# Patient Record
Sex: MALE | Race: WHITE | URBAN - METROPOLITAN AREA
[De-identification: names, ages, dates, MRNs, and addresses within clinical notes are randomized per-mention and may not be internally consistent; named-entity substitution may affect disease eponyms.]

---

## 2017-11-30 ENCOUNTER — OFFICE VISIT (OUTPATIENT)
Dept: FAMILY MEDICINE CLINIC | Age: 47
End: 2017-11-30

## 2017-11-30 VITALS
HEIGHT: 69 IN | OXYGEN SATURATION: 99 % | WEIGHT: 173 LBS | HEART RATE: 81 BPM | TEMPERATURE: 97.7 F | SYSTOLIC BLOOD PRESSURE: 120 MMHG | BODY MASS INDEX: 25.62 KG/M2 | RESPIRATION RATE: 15 BRPM | DIASTOLIC BLOOD PRESSURE: 70 MMHG

## 2017-11-30 DIAGNOSIS — M25.532 PAIN IN BOTH WRISTS: Primary | ICD-10-CM

## 2017-11-30 DIAGNOSIS — M25.531 PAIN IN BOTH WRISTS: Primary | ICD-10-CM

## 2017-11-30 RX ORDER — DICLOFENAC SODIUM 50 MG/1
50 TABLET, DELAYED RELEASE ORAL 2 TIMES DAILY
Qty: 40 TAB | Refills: 1 | Status: SHIPPED | OUTPATIENT
Start: 2017-11-30

## 2017-11-30 NOTE — PATIENT INSTRUCTIONS
Musculoskeletal Pain: Care Instructions  Your Care Instructions    Different problems with the bones, muscles, nerves, ligaments, and tendons in the body can cause pain. One or more areas of your body may ache or burn. Or they may feel tired, stiff, or sore. The medical term for this type of pain is musculoskeletal pain. It can have many different causes. Sometimes the pain is caused by an injury such as a strain or sprain. Or you might have pain from using one part of your body in the same way over and over again. This is called overuse. In some cases, the cause of the pain is another health problem such as arthritis or fibromyalgia. The doctor will examine you and ask you questions about your health to help find the cause of your pain. Blood tests or imaging tests like an X-ray may also be helpful. But sometimes doctors can't find a cause of the pain. Treatment depends on your symptoms and the cause of the pain, if known. The doctor has checked you carefully, but problems can develop later. If you notice any problems or new symptoms, get medical treatment right away. Follow-up care is a key part of your treatment and safety. Be sure to make and go to all appointments, and call your doctor if you are having problems. It's also a good idea to know your test results and keep a list of the medicines you take. How can you care for yourself at home? · Rest until you feel better. · Do not do anything that makes the pain worse. Return to exercise gradually if you feel better and your doctor says it's okay. · Be safe with medicines. Read and follow all instructions on the label. ¨ If the doctor gave you a prescription medicine for pain, take it as prescribed. ¨ If you are not taking a prescription pain medicine, ask your doctor if you can take an over-the-counter medicine. · Put ice or a cold pack on the area for 10 to 20 minutes at a time to ease pain.  Put a thin cloth between the ice and your skin.  When should you call for help? Call your doctor now or seek immediate medical care if:  ? · You have new pain, or your pain gets worse. ? · You have new symptoms such as a fever, a rash, or chills. ? Watch closely for changes in your health, and be sure to contact your doctor if:  ? · You do not get better as expected. Where can you learn more? Go to http://danielle-latoya.info/. Enter O322 in the search box to learn more about \"Musculoskeletal Pain: Care Instructions. \"  Current as of: October 14, 2016  Content Version: 11.4  © 5001-5867 [employer]. Care instructions adapted under license by [employer] (which disclaims liability or warranty for this information). If you have questions about a medical condition or this instruction, always ask your healthcare professional. [address] any warranty or liability for your use of this information. Carpal Tunnel Syndrome: Care Instructions  Your Care Instructions    Carpal tunnel syndrome is a nerve problem. It can cause tingling, numbness, weakness, or pain in the fingers, thumb, and hand. The median nerve and several tough tissues called tendons run through a space in the wrist called the carpal tunnel. The repeated hand motions used in work and some hobbies and sports can put pressure on the nerve. Pregnancy and several conditions, including diabetes, arthritis, and an underactive thyroid, also can cause carpal tunnel syndrome. You may be able to limit an activity or do it differently to reduce your symptoms. You also can take other steps to feel better. If your symptoms are mild, 1 to 2 weeks of home treatment are likely to ease your pain. Surgery is needed only if other treatments do not work. Follow-up care is a key part of your treatment and safety. Be sure to make and go to all appointments, and call your doctor if you are having problems.  It's also a good idea to know your test results and keep a list of the medicines you take. How can you care for yourself at home? · If possible, stop or reduce the activity that causes your symptoms. If you cannot stop the activity, take frequent breaks to rest and stretch or change hand positions to do a task. Try switching hands, such as when using a computer mouse. · Try to avoid bending or twisting your wrists. · Ask your doctor if you can take an over-the-counter pain medicine, such as acetaminophen (Tylenol), ibuprofen (Advil, Motrin), or naproxen (Aleve). Be safe with medicines. Read and follow all instructions on the label. · If your doctor prescribes corticosteroid medicine to help reduce pain and swelling, take it exactly as prescribed. Call your doctor if you think you are having a problem with your medicine. · Put ice or a cold pack on your wrist for 10 to 20 minutes at a time to ease pain. Put a thin cloth between the ice and your skin. · If your doctor or your physical or occupational therapist tells you to wear a wrist splint, wear it as directed to keep your wrist in a neutral position. This also eases pressure on your median nerve. · Ask your doctor whether you should have physical or occupational therapy to learn how to do tasks differently. · Try a yoga class to stretch your muscles and build strength in your hands and wrists. Yoga has been shown to ease carpal tunnel symptoms. To prevent carpal tunnel  · When working at a computer, keep your hands and wrists in line with your forearms. Hold your elbows close to your sides. Take a break every 10 to 15 minutes. · Try these exercises:  ¨ Warm up: Rotate your wrist up, down, and from side to side. Repeat this 4 times. Stretch your fingers far apart, relax them, then stretch them again. Repeat 4 times. Stretch your thumb by pulling it back gently, holding it, and then releasing it. Repeat 4 times.   ¨ Prayer stretch: Start with your palms together in front of your chest just below your chin. Slowly lower your hands toward your waistline while keeping your hands close to your stomach and your palms together until you feel a mild to moderate stretch under your forearms. Hold for 10 to 20 seconds. Repeat 4 times. ¨ Wrist flexor stretch: Hold your arm in front of you with your palm up. Bend your wrist, pointing your hand toward the floor. With your other hand, gently bend your wrist further until you feel a mild to moderate stretch in your forearm. Hold for 10 to 20 seconds. Repeat 4 times. ¨ Wrist extensor stretch: Repeat the steps for the wrist flexor stretch, but begin with your extended hand palm down. · Squeeze a rubber exercise ball several times a day to keep your hands and fingers strong. · Avoid holding objects (such as a book) in one position for a long time. When possible, use your whole hand to grasp an object. Using just the thumb and index finger can put stress on the wrist.  · Do not smoke. It can make this condition worse by reducing blood flow to the median nerve. If you need help quitting, talk to your doctor about stop-smoking programs and medicines. These can increase your chances of quitting for good. When should you call for help? Watch closely for changes in your health, and be sure to contact your doctor if:  ? · Your pain or other problems do not get better with home care. ? · You want more information about physical or occupational therapy. ? · You have side effects of your corticosteroid medicine, such as:  ¨ Weight gain. ¨ Mood changes. ¨ Trouble sleeping. ¨ Bruising easily. ? · You have any other problems with your medicine. Where can you learn more? Go to http://danielle-latoya.info/. Enter R432 in the search box to learn more about \"Carpal Tunnel Syndrome: Care Instructions. \"  Current as of: March 21, 2017  Content Version: 11.4  © 4677-8376 [employer].  Care instructions adapted under license by [employer] (which disclaims liability or warranty for this information). If you have questions about a medical condition or this instruction, always ask your healthcare professional. [address] any warranty or liability for your use of this information.

## 2017-11-30 NOTE — MR AVS SNAPSHOT
Visit Information Date & Time Provider Department Dept. Phone Encounter #  
 11/30/2017 10:45 AM Governor Damien MD [phone] [id number] Follow-up Instructions Return if symptoms worsen or fail to improve. Upcoming Health Maintenance Date Due Pneumococcal 19-64 Medium Risk (1 of 1 - PPSV23) 5/17/1991 DTaP/Tdap/Td series (1 - Tdap) 5/17/1993 Allergies as of 11/30/2017  Review Complete On: 11/30/2017 By: Governor Damien MD  
 No Known Allergies Current Immunizations  Never Reviewed No immunizations on file. Not reviewed this visit You Were Diagnosed With   
  
 Codes Comments Pain in both wrists    -  Primary ICD-10-CM: M25.531, M25.532 ICD-9-CM: 719.43 Vitals BP Pulse Temp Resp Height(growth percentile) Weight(growth percentile) 120/70 81 97.7 °F (36.5 °C) 15 5' 9\" (1.753 m) 173 lb (78.5 kg) SpO2 BMI Smoking Status 99% 25.55 kg/m2 Former Smoker Vitals History BMI and BSA Data Body Mass Index Body Surface Area 25.55 kg/m 2 1.95 m 2 Your Updated Medication List  
  
   
This list is accurate as of: 11/30/17 11:18 AM.  Always use your most recent med list.  
  
  
  
  
 diclofenac EC 50 mg EC tablet Commonly known as:  VOLTAREN Take 1 Tab by mouth two (2) times a day. Prescriptions Printed Refills  
 diclofenac EC (VOLTAREN) 50 mg EC tablet 1 Sig: Take 1 Tab by mouth two (2) times a day. Class: Print Route: Oral  
  
Follow-up Instructions Return if symptoms worsen or fail to improve. To-Do List   
 11/30/2017 Lab:  CBC WITH AUTOMATED DIFF   
  
 11/30/2017 Neurology:  EMG TWO EXTREMITIES UPPER   
  
 11/30/2017 Lab:  LIPID PANEL   
  
 11/30/2017 Lab:  METABOLIC PANEL, COMPREHENSIVE   
  
 11/30/2017 Lab:  SED RATE (ESR)   
  
 11/30/2017 Lab:  VITAMIN D, 25 HYDROXY Referral Information Referral ID Referred By Referred To  
  
 [id number] Erica Martinezch[facility]RO Not Available Visits Status Start Date End Date 1 New Request 11/30/17 11/30/18 If your referral has a status of pending review or denied, additional information will be sent to support the outcome of this decision. Patient Instructions Musculoskeletal Pain: Care Instructions Your Care Instructions Different problems with the bones, muscles, nerves, ligaments, and tendons in the body can cause pain. One or more areas of your body may ache or burn. Or they may feel tired, stiff, or sore. The medical term for this type of pain is musculoskeletal pain. It can have many different causes. Sometimes the pain is caused by an injury such as a strain or sprain. Or you might have pain from using one part of your body in the same way over and over again. This is called overuse. In some cases, the cause of the pain is another health problem such as arthritis or fibromyalgia. The doctor will examine you and ask you questions about your health to help find the cause of your pain. Blood tests or imaging tests like an X-ray may also be helpful. But sometimes doctors can't find a cause of the pain. Treatment depends on your symptoms and the cause of the pain, if known. The doctor has checked you carefully, but problems can develop later. If you notice any problems or new symptoms, get medical treatment right away. Follow-up care is a key part of your treatment and safety. Be sure to make and go to all appointments, and call your doctor if you are having problems. It's also a good idea to know your test results and keep a list of the medicines you take. How can you care for yourself at home? · Rest until you feel better. · Do not do anything that makes the pain worse. Return to exercise gradually if you feel better and your doctor says it's okay. · Be safe with medicines. Read and follow all instructions on the label. ¨ If the doctor gave you a prescription medicine for pain, take it as prescribed. ¨ If you are not taking a prescription pain medicine, ask your doctor if you can take an over-the-counter medicine. · Put ice or a cold pack on the area for 10 to 20 minutes at a time to ease pain. Put a thin cloth between the ice and your skin. When should you call for help? Call your doctor now or seek immediate medical care if: 
? · You have new pain, or your pain gets worse. ? · You have new symptoms such as a fever, a rash, or chills. ? Watch closely for changes in your health, and be sure to contact your doctor if: 
? · You do not get better as expected. Where can you learn more? Go to http://danielle-latoya.info/. Enter U444 in the search box to learn more about \"Musculoskeletal Pain: Care Instructions. \" Current as of: October 14, 2016 Content Version: 11.4 © 0690-1230 [employer]. Care instructions adapted under license by [employer] (which disclaims liability or warranty for this information). If you have questions about a medical condition or this instruction, always ask your healthcare professional. [place] [address] any warranty or liability for your use of this information. Carpal Tunnel Syndrome: Care Instructions Your Care Instructions Carpal tunnel syndrome is a nerve problem. It can cause tingling, numbness, weakness, or pain in the fingers, thumb, and hand. The median nerve and several tough tissues called tendons run through a space in the wrist called the carpal tunnel. The repeated hand motions used in work and some hobbies and sports can put pressure on the nerve. Pregnancy and several conditions, including diabetes, arthritis, and an underactive thyroid, also can cause carpal tunnel syndrome.  
You may be able to limit an activity or do it differently to reduce your symptoms. You also can take other steps to feel better. If your symptoms are mild, 1 to 2 weeks of home treatment are likely to ease your pain. Surgery is needed only if other treatments do not work. Follow-up care is a key part of your treatment and safety. Be sure to make and go to all appointments, and call your doctor if you are having problems. It's also a good idea to know your test results and keep a list of the medicines you take. How can you care for yourself at home? · If possible, stop or reduce the activity that causes your symptoms. If you cannot stop the activity, take frequent breaks to rest and stretch or change hand positions to do a task. Try switching hands, such as when using a computer mouse. · Try to avoid bending or twisting your wrists. · Ask your doctor if you can take an over-the-counter pain medicine, such as acetaminophen (Tylenol), ibuprofen (Advil, Motrin), or naproxen (Aleve). Be safe with medicines. Read and follow all instructions on the label. · If your doctor prescribes corticosteroid medicine to help reduce pain and swelling, take it exactly as prescribed. Call your doctor if you think you are having a problem with your medicine. · Put ice or a cold pack on your wrist for 10 to 20 minutes at a time to ease pain. Put a thin cloth between the ice and your skin. · If your doctor or your physical or occupational therapist tells you to wear a wrist splint, wear it as directed to keep your wrist in a neutral position. This also eases pressure on your median nerve. · Ask your doctor whether you should have physical or occupational therapy to learn how to do tasks differently. · Try a yoga class to stretch your muscles and build strength in your hands and wrists. Yoga has been shown to ease carpal tunnel symptoms. To prevent carpal tunnel · When working at a computer, keep your hands and wrists in line with your forearms. Hold your elbows close to your sides. Take a break every 10 to 15 minutes. · Try these exercises: ¨ Warm up: Rotate your wrist up, down, and from side to side. Repeat this 4 times. Stretch your fingers far apart, relax them, then stretch them again. Repeat 4 times. Stretch your thumb by pulling it back gently, holding it, and then releasing it. Repeat 4 times. ¨ Prayer stretch: Start with your palms together in front of your chest just below your chin. Slowly lower your hands toward your waistline while keeping your hands close to your stomach and your palms together until you feel a mild to moderate stretch under your forearms. Hold for 10 to 20 seconds. Repeat 4 times. ¨ Wrist flexor stretch: Hold your arm in front of you with your palm up. Bend your wrist, pointing your hand toward the floor. With your other hand, gently bend your wrist further until you feel a mild to moderate stretch in your forearm. Hold for 10 to 20 seconds. Repeat 4 times. ¨ Wrist extensor stretch: Repeat the steps for the wrist flexor stretch, but begin with your extended hand palm down. · Squeeze a rubber exercise ball several times a day to keep your hands and fingers strong. · Avoid holding objects (such as a book) in one position for a long time. When possible, use your whole hand to grasp an object. Using just the thumb and index finger can put stress on the wrist. 
· Do not smoke. It can make this condition worse by reducing blood flow to the median nerve. If you need help quitting, talk to your doctor about stop-smoking programs and medicines. These can increase your chances of quitting for good. When should you call for help? Watch closely for changes in your health, and be sure to contact your doctor if: 
? · Your pain or other problems do not get better with home care. ? · You want more information about physical or occupational therapy. ? · You have side effects of your corticosteroid medicine, such as: ¨ Weight gain. ¨ Mood changes. ¨ Trouble sleeping. ¨ Bruising easily. ? · You have any other problems with your medicine. Where can you learn more? Go to http://danielle-latoya.info/. Enter R432 in the search box to learn more about \"Carpal Tunnel Syndrome: Care Instructions. \" Current as of: March 21, 2017 Content Version: 11.4 © 6800-0396 [employer]ostle. Care instructions adapted under license by [employer] (which disclaims liability or warranty for this information). If you have questions about a medical condition or this instruction, always ask your healthcare professional. [place] [address] any warranty or liability for your use of this information. Introducing [facility]! [place] introduces Fresvii patient portal. Now you can access parts of your medical record, email your doctor's office, and request medication refills online. 1. In your internet browser, go to [email or web address] 2. Click on the First Time User? Click Here link in the Sign In box. You will see the New Member Sign Up page. 3. Enter your Fresvii Access Code exactly as it appears below. You will not need to use this code after youve completed the sign-up process. If you do not sign up before the expiration date, you must request a new code. · Fresvii Access Code: [email or web address] Expires: 2/28/2018 10:56 AM 
 
4. Enter the last four digits of your Social Security Number (xxxx) and Date of Birth (mm/dd/yyyy) as indicated and click Submit. You will be taken to the next sign-up page. 5. Create a Fresvii ID. This will be your Fresvii login ID and cannot be changed, so think of one that is secure and easy to remember. 6. Create a Fresvii password. You can change your password at any time. 7. Enter your Password Reset Question and Answer. This can be used at a later time if you forget your password. 8. Enter your e-mail address. You will receive e-mail notification when new information is available in [address]. 9. Click Sign Up. You can now view and download portions of your medical record. 10. Click the Download Summary menu link to download a portable copy of your medical information. If you have questions, please visit the Frequently Asked Questions section of the [employer] website. Remember, [employer] is NOT to be used for urgent needs. For medical emergencies, dial 911. Now available from your iPhone and Android! Please provide this summary of care documentation to your next provider. Your primary care clinician is listed as [id number] [facility]. If you have any questions after today's visit, please call [phone].

## 2017-11-30 NOTE — PROGRESS NOTES
Courtney Girard. is a 41 y.o. male  presents for establish care. He has history of bilateral wrist pain. No Known Allergies    Patient Active Problem List   Diagnosis Code    Asthma J45.909    Hyperlipidemia E78.5     No past medical history on file. Social History     Social History    Marital status:      Spouse name: N/A    Number of children: N/A    Years of education: N/A     Social History Main Topics    Smoking status: Former Smoker     Quit date: 6/1/2001    Smokeless tobacco: Never Used    Alcohol use Yes      Comment: occ    Drug use: No    Sexual activity: Yes     Partners: Female     Other Topics Concern    None     Social History Narrative     Family History   Problem Relation Age of Onset    Heart Disease Father         Review of Systems   Constitutional: Negative for chills and fever. Cardiovascular: Negative for chest pain and palpitations. Gastrointestinal: Negative for nausea and vomiting. Vitals:    11/30/17 1059   BP: 120/70   Pulse: 81   Resp: 15   Temp: 97.7 °F (36.5 °C)   SpO2: 99%   Weight: 173 lb (78.5 kg)   Height: 5' 9\" (1.753 m)   PainSc:   3   PainLoc: Wrist       Physical Exam   Constitutional: He is oriented to person, place, and time and well-developed, well-nourished, and in no distress. Cardiovascular: Normal rate, regular rhythm and normal heart sounds. Pulmonary/Chest: Effort normal and breath sounds normal.   Musculoskeletal: Normal range of motion. He exhibits tenderness. He exhibits no edema or deformity. Neurological: He is alert and oriented to person, place, and time. Gait normal.   Skin: Skin is warm and dry. Nursing note and vitals reviewed. Assessment/Plan      ICD-10-CM ICD-9-CM    1.  Pain in both wrists M25.531 719.43 CBC WITH AUTOMATED DIFF    M25.532  LIPID PANEL      METABOLIC PANEL, COMPREHENSIVE      VITAMIN D, 25 HYDROXY      SED RATE (ESR)      EMG TWO EXTREMITIES UPPER      diclofenac EC (VOLTAREN) 50 mg EC tablet CBC WITH AUTOMATED DIFF      LIPID PANEL      METABOLIC PANEL, COMPREHENSIVE      VITAMIN D, 25 HYDROXY      SED RATE (ESR)     Follow-up Disposition:  Return if symptoms worsen or fail to improve.  lab results and schedule of future lab studies reviewed with patient    I have discussed the diagnosis with the patient and the intended plan of care as seen in the above orders. The patient has received an after-visit summary and questions were answered concerning future plans. I have discussed medication, side effects, and warnings with the patient in detail. The patient verbalized understanding and is in agreement with the plan of care. The patient will contact the office with any additional concerns.     Meaghan Montero MD

## 2017-11-30 NOTE — PROGRESS NOTES
Pt in office for bilateral wrist pain. Believes it's carpal tunnel. It has been bothering him for years. C/o left wrist worse than the right.

## 2017-12-01 LAB
25(OH)D3+25(OH)D2 SERPL-MCNC: 26.1 NG/ML (ref 30–100)
ALBUMIN SERPL-MCNC: 4.6 G/DL (ref 3.5–5.5)
ALBUMIN/GLOB SERPL: 1.8 {RATIO} (ref 1.2–2.2)
ALP SERPL-CCNC: 63 IU/L (ref 39–117)
ALT SERPL-CCNC: 20 IU/L (ref 0–44)
AST SERPL-CCNC: 24 IU/L (ref 0–40)
BASOPHILS # BLD AUTO: 0.1 X10E3/UL (ref 0–0.2)
BASOPHILS NFR BLD AUTO: 1 %
BILIRUB SERPL-MCNC: 0.4 MG/DL (ref 0–1.2)
BUN SERPL-MCNC: 15 MG/DL (ref 6–24)
BUN/CREAT SERPL: 14 (ref 9–20)
CALCIUM SERPL-MCNC: 10.3 MG/DL (ref 8.7–10.2)
CHLORIDE SERPL-SCNC: 99 MMOL/L (ref 96–106)
CHOLEST SERPL-MCNC: 194 MG/DL (ref 100–199)
CO2 SERPL-SCNC: 27 MMOL/L (ref 18–29)
CREAT SERPL-MCNC: 1.05 MG/DL (ref 0.76–1.27)
EOSINOPHIL # BLD AUTO: 0.4 X10E3/UL (ref 0–0.4)
EOSINOPHIL NFR BLD AUTO: 5 %
ERYTHROCYTE [DISTWIDTH] IN BLOOD BY AUTOMATED COUNT: 13.4 % (ref 12.3–15.4)
ERYTHROCYTE [SEDIMENTATION RATE] IN BLOOD BY WESTERGREN METHOD: 3 MM/HR (ref 0–15)
GFR SERPLBLD CREATININE-BSD FMLA CKD-EPI: 85 ML/MIN/1.73
GFR SERPLBLD CREATININE-BSD FMLA CKD-EPI: 99 ML/MIN/1.73
GLOBULIN SER CALC-MCNC: 2.5 G/DL (ref 1.5–4.5)
GLUCOSE SERPL-MCNC: 78 MG/DL (ref 65–99)
HCT VFR BLD AUTO: 45.1 % (ref 37.5–51)
HDLC SERPL-MCNC: 69 MG/DL
HGB BLD-MCNC: 14.9 G/DL (ref 12.6–17.7)
IMM GRANULOCYTES # BLD: 0 X10E3/UL (ref 0–0.1)
IMM GRANULOCYTES NFR BLD: 0 %
INTERPRETATION, 910389: NORMAL
LDLC SERPL CALC-MCNC: 106 MG/DL (ref 0–99)
LYMPHOCYTES # BLD AUTO: 2 X10E3/UL (ref 0.7–3.1)
LYMPHOCYTES NFR BLD AUTO: 28 %
MCH RBC QN AUTO: 29.4 PG (ref 26.6–33)
MCHC RBC AUTO-ENTMCNC: 33 G/DL (ref 31.5–35.7)
MCV RBC AUTO: 89 FL (ref 79–97)
MONOCYTES # BLD AUTO: 0.9 X10E3/UL (ref 0.1–0.9)
MONOCYTES NFR BLD AUTO: 12 %
NEUTROPHILS # BLD AUTO: 3.7 X10E3/UL (ref 1.4–7)
NEUTROPHILS NFR BLD AUTO: 54 %
PLATELET # BLD AUTO: 275 X10E3/UL (ref 150–379)
POTASSIUM SERPL-SCNC: 5.8 MMOL/L (ref 3.5–5.2)
PROT SERPL-MCNC: 7.1 G/DL (ref 6–8.5)
RBC # BLD AUTO: 5.07 X10E6/UL (ref 4.14–5.8)
SODIUM SERPL-SCNC: 143 MMOL/L (ref 134–144)
TRIGL SERPL-MCNC: 95 MG/DL (ref 0–149)
VLDLC SERPL CALC-MCNC: 19 MG/DL (ref 5–40)
WBC # BLD AUTO: 7 X10E3/UL (ref 3.4–10.8)

## 2017-12-04 NOTE — PROGRESS NOTES
Vitamin d was low and K was a little elevated. Will start otc vitamin d 2000 iu q day. Will discuss at follow up in about 2 months.

## 2017-12-11 ENCOUNTER — TELEPHONE (OUTPATIENT)
Dept: FAMILY MEDICINE CLINIC | Age: 47
End: 2017-12-11

## 2017-12-11 DIAGNOSIS — M25.532 BILATERAL WRIST PAIN: Primary | ICD-10-CM

## 2017-12-11 DIAGNOSIS — M25.531 BILATERAL WRIST PAIN: Primary | ICD-10-CM

## 2017-12-11 NOTE — TELEPHONE ENCOUNTER
Patient's wife asked about the EMG order, said no one had called to schedule. It looks like a  made a note that this test is not done by [facility]. Patient's wife said Kaden Delgado. [facility] would be the most convenient if they do it. I told her I would have to get the nurse to check into this.

## 2017-12-13 NOTE — TELEPHONE ENCOUNTER
Unable to reach at numbers on file. VM not setup. Referral for neuro placed as EMG has to be done with neurology. Dr. Amelie Brown office can do testing.